# Patient Record
Sex: FEMALE | Race: WHITE | URBAN - METROPOLITAN AREA
[De-identification: names, ages, dates, MRNs, and addresses within clinical notes are randomized per-mention and may not be internally consistent; named-entity substitution may affect disease eponyms.]

---

## 2017-04-04 ENCOUNTER — TELEPHONE (OUTPATIENT)
Dept: OBGYN | Facility: CLINIC | Age: 57
End: 2017-04-04

## 2017-04-04 DIAGNOSIS — F32.A DEPRESSION, UNSPECIFIED DEPRESSION TYPE: ICD-10-CM

## 2017-04-04 RX ORDER — SERTRALINE HYDROCHLORIDE 25 MG/1
25 TABLET, FILM COATED ORAL DAILY
Qty: 90 TABLET | Refills: 0 | Status: SHIPPED | OUTPATIENT
Start: 2017-04-04 | End: 2017-07-06

## 2017-04-04 NOTE — TELEPHONE ENCOUNTER
----- Message from Itzel Kee sent at 4/4/2017 11:47 AM CDT -----  Regarding: Pt request  Contact: [phone]  Pt is  and has been away on Deployment.  She scheduled an Annual Return with Dr. Llamas, 1st available is 7/6/17.  Pt needs a refill for sertraline (ZOLOFT) 25 MG tablet.  She states that with her [employer] Insurance they are not able to request the refill through the usual process of having the Pharmacy request the refill. She was told that she has to call the Clinic directly to request refills.  Pharmacy is [facility].  The phone number for them is : [phone]   Pt  Prescription drug card patient number is  [id number].  Please call pt to assist.

## 2017-04-04 NOTE — TELEPHONE ENCOUNTER
Dr. Llamas approved refill in clinic. Nurse called into [facility] pharmacy with number listed from patient. [facility] pharmacy does not have e-prescribing in EPIC. Patient now has enough to get her through until her annual appointment.

## 2017-05-05 ENCOUNTER — TELEPHONE (OUTPATIENT)
Dept: OBGYN | Facility: CLINIC | Age: 57
End: 2017-05-05

## 2017-05-05 DIAGNOSIS — Z12.11 SCREENING FOR COLON CANCER: Primary | ICD-10-CM

## 2017-05-05 NOTE — TELEPHONE ENCOUNTER
"Pt has been away on deployment and is \"getting caught up\" with all of her health care needs. Placed referral for colonoscopy per GI request, and pt has annual scheduled with Dr. Llamas.   "

## 2017-05-05 NOTE — TELEPHONE ENCOUNTER
----- Message from Carline Black sent at 5/5/2017 10:22 AM CDT -----  Regarding: order for Endoscopy?  Contact: [phone]  Pt received a letter from Dr Ross office([facility]) stating she would need a order from her PCP which she said her PCP was Dr Llamas, I did inform her that Dr Llamas could not be a PCP as she is a OBGYN Dr but she is just wanting to know if she would need to come in to see Dr Llamas or if the order could just be entered?    Please call pt back at [phone] to let her know if Dr Llamas could do this or if Dr Llamas will not do this   Thank you!    Carline Black  Intake representative   Call Center

## 2017-05-08 ENCOUNTER — RADIANT APPOINTMENT (OUTPATIENT)
Dept: MAMMOGRAPHY | Facility: CLINIC | Age: 57
End: 2017-05-08

## 2017-05-08 DIAGNOSIS — Z12.31 VISIT FOR SCREENING MAMMOGRAM: ICD-10-CM

## 2017-07-01 ENCOUNTER — HEALTH MAINTENANCE LETTER (OUTPATIENT)
Age: 57
End: 2017-07-01

## 2017-07-06 ENCOUNTER — TELEPHONE (OUTPATIENT)
Dept: GASTROENTEROLOGY | Facility: CLINIC | Age: 57
End: 2017-07-06

## 2017-07-06 ENCOUNTER — OFFICE VISIT (OUTPATIENT)
Dept: OBGYN | Facility: CLINIC | Age: 57
End: 2017-07-06
Attending: OBSTETRICS & GYNECOLOGY
Payer: OTHER GOVERNMENT

## 2017-07-06 VITALS — BODY MASS INDEX: 26.13 KG/M2 | WEIGHT: 172.4 LBS | HEIGHT: 68 IN

## 2017-07-06 DIAGNOSIS — Z00.00 VISIT FOR PREVENTIVE HEALTH EXAMINATION: ICD-10-CM

## 2017-07-06 DIAGNOSIS — R19.7 DIARRHEA OF PRESUMED INFECTIOUS ORIGIN: Primary | ICD-10-CM

## 2017-07-06 DIAGNOSIS — N90.5 ATROPHY, VULVA: ICD-10-CM

## 2017-07-06 DIAGNOSIS — F32.A DEPRESSION, UNSPECIFIED DEPRESSION TYPE: ICD-10-CM

## 2017-07-06 PROCEDURE — 87177 OVA AND PARASITES SMEARS: CPT | Performed by: OBSTETRICS & GYNECOLOGY

## 2017-07-06 PROCEDURE — 99212 OFFICE O/P EST SF 10 MIN: CPT

## 2017-07-06 PROCEDURE — 87209 SMEAR COMPLEX STAIN: CPT | Performed by: OBSTETRICS & GYNECOLOGY

## 2017-07-06 RX ORDER — ESTRADIOL 0.1 MG/G
2 CREAM VAGINAL WEEKLY
Qty: 42.5 G | Refills: 3 | Status: SHIPPED | OUTPATIENT
Start: 2017-07-06 | End: 2017-07-06

## 2017-07-06 RX ORDER — CELECOXIB 200 MG/1
200 CAPSULE ORAL 2 TIMES DAILY
Qty: 90 CAPSULE | Refills: 3 | Status: SHIPPED | OUTPATIENT
Start: 2017-07-06 | End: 2017-07-17

## 2017-07-06 RX ORDER — ESTRADIOL 0.1 MG/G
2 CREAM VAGINAL WEEKLY
Qty: 42.5 G | Refills: 3 | Status: SHIPPED | OUTPATIENT
Start: 2017-07-06 | End: 2017-07-17

## 2017-07-06 RX ORDER — CELECOXIB 200 MG/1
CAPSULE ORAL
COMMUNITY
Start: 2016-05-05 | End: 2017-07-06

## 2017-07-06 RX ORDER — SERTRALINE HYDROCHLORIDE 25 MG/1
25 TABLET, FILM COATED ORAL DAILY
Qty: 90 TABLET | Refills: 3 | Status: SHIPPED | OUTPATIENT
Start: 2017-07-06 | End: 2017-07-17

## 2017-07-06 NOTE — LETTER
2017       RE: Arina Garcia  [address] [address]     Dear Colleague,    Thank you for referring your patient, Arina Garcia, to the [facility] at [facility]. Please see a copy of my visit note below.    Progress Note    SUBJECTIVE:  Arina Garcia is an 56 year old, , who requests an Annual Preventive Exam.       Concerns today include: vaginal pain    Menstrual History:  No flowsheet data found.    Last    Lab Results   Component Value Date    PAP NIL 2014     History of abnormal Pap smear: NO - age 30-65 PAP every 5 years with negative HPV co-testing recommended        Mammogram current: yes  Last Mammogram:   Ma Screening Digital Bilateral    Result Date: 3/28/2016  Narrative: SCREENING MAMMOGRAM, BILATERAL, DIGITAL, with CAD 3/28/2016 HISTORY: No current breast complaints noted COMPARISON:3/10/2015, 2014, 2014, 2012. BREAST DENSITY: Heterogeneously dense. No significant change.     Ma Screening Digital Bilateral    Result Date: 3/11/2015  Narrative: SCREENING MAMMOGRAM, BILATERAL, DIGITAL w/CAD - 3/10/2015 9:47 AM. BREAST SYMPTOMS: No current breast complaints. COMPARISON:  Diagnostic mammogram 2014, screening mammogram 2014, 2013, 2012, 2011. BREAST DENSITY: Heterogeneously dense. COMMENTS: No findings of suspicion for malignancy.   No significant change.     Ma Screening Digital Bilateral    Result Date: 2014  Narrative: Examination: Bilateral digital screening mammography with computer aided detection. Comparison: 2013, 2012 History: No symptoms, routine screening. 53-year-old female no current breast problems noted. BREAST DENSITY: Heterogeneously dense. COMMENTS: Possible developing calcifications in the anterior third of the right breast at approximately the 12:00 position.        Last Colonoscopy:  due      HISTORY:    Current  Outpatient Prescriptions on File Prior to Visit:  Omeprazole (PRILOSEC PO) Take  by mouth.   Multiple Vitamin (MULTI-VITAMIN PO) Take  by mouth.   [DISCONTINUED] sertraline (ZOLOFT) 25 MG tablet Take 1 tablet (25 mg) by mouth daily   Fluticasone-Salmeterol (ADVAIR HFA IN) Inhale  into the lungs.   GLUCOSAMINE PO Take  by mouth.     No current facility-administered medications on file prior to visit.   Allergies   Allergen Reactions     Cats      Dogs      Dust Mites        There is no immunization history on file for this patient.    Obstetric History       T0      L0     SAB0   TAB0   Ectopic0   Multiple0   Live Births0      Past Medical History:   Diagnosis Date     Plantar fasciitis      Past Surgical History:   Procedure Laterality Date     C LAPAROSCOPIC SUPRACERVICAL HYSTERECTOMY (SUBTOTAL HYSTERECTOMY), WITH OR  2009    simple hyperplasia, KIMBERLI     Family History   Problem Relation Age of Onset     C.A.D. Mother      renal failure on hospice 14     Cardiovascular Mother      CHF     Social History     Social History     Marital status: Single     Spouse name: N/A     Number of children: N/A     Years of education: N/A     Occupational History     [employer]     Social History Main Topics     Smoking status: Never Smoker     Smokeless tobacco: Never Used     Alcohol use No     Drug use: No     Sexual activity: Yes     Partners: Female     Other Topics Concern      Service Yes     [employer]a in [employer]     Blood Transfusions No     Caffeine Concern No     Occupational Exposure Yes     Hobby Hazards No     Sleep Concern No     Stress Concern No     Weight Concern No     Special Diet No     Back Care Yes     Exercise Yes     Bike Helmet Yes     Seat Belt Yes     Self-Exams Yes     Social History Narrative    2015    Sold her house in [place] and moved to [place] with new partner. Still working full time and in [employer].     Very happy.     Rides bike a lot.   "   Jo Llamas MD             How much exercise per week? 2 days    How much calcium per day? Vitamins and diet       How much caffeine per day? 3-4 cups    How much vitamin D per day? Vitamins and diet    Do you/your family wear seatbelts?  Yes    Do you/your family use safety helmets? No    Do you/your family use sunscreen? Yes    Do you/your family keep firearms in the home? Yes    Do you/your family have a smoke detector(s)? Yes        Do you feel safe in your home? Yes    Has anyone ever touched you in an unwanted manner? No        .memed            Long term monogamous lesbian relationship        4/25/14    She and partner bought camper trailer    2 small dogs    Lives in [facility].    Jo Llamas MD               ROS  [unfilled]  No flowsheet data found.  No flowsheet data found.      EXAM:  Height 1.715 m (5' 7.5\"), weight 78.2 kg (172 lb 6.4 oz), not currently breastfeeding. Body mass index is 26.6 kg/(m^2).  General - pleasant female in no acute distress.  Skin - no suspicious lesions or rashes  EENT-  PERRLA, euthyroid with out palpable nodules  Neck - supple without lymphadenopathy.  Lungs - clear to auscultation bilaterally.  Heart - regular rate and rhythm without murmur.  Abdomen - soft, nontender, nondistended, no masses or organomegaly noted.  Musculoskeletal - no gross deformities.  Neurological - normal strength, sensation, and mental status.    Breast Exam:  Breast: Without visible skin changes. No dimpling or lesions seen.   Breasts supple, non-tender with palpation, no dominant mass, nodularity, or nipple discharge noted bilaterally. Axillary nodes negative.      Pelvic Exam:  EG/BUS: Normal genital architecture without lesions, erythema or abnormal secretions Bartholin's, Urethra, [place]'s normal   Urethral meatus: normal   Urethra: no masses, tenderness, or scarring   Bladder: no masses or tenderness   Vagina: atrophic, thin, dry with odorless  secretions  Cervix: Nulliparous, and " no lesions  Uterus: surgically absent  Adnexa: Within normal limits and No masses, nodularity, tenderness  Rectum:anus normal       ASSESSMENT:  Encounter Diagnoses   Name Primary?     Diarrhea of presumed infectious origin Yes     Atrophy, vulva      Depression, unspecified depression type         PLAN:   Orders Placed This Encounter   Procedures     GASTROENTEROLOGY ADULT REF PROCEDURE ONLY     Orders Placed This Encounter   Medications     DISCONTD: celecoxib (CELEBREX) 200 MG capsule     DISCONTD: estradiol (ESTRACE VAGINAL) 0.1 MG/GM cream     Sig: Place 2 g vaginally once a week     Dispense:  42.5 g     Refill:  3     estradiol (ESTRACE VAGINAL) 0.1 MG/GM cream     Sig: Place 2 g vaginally once a week     Dispense:  42.5 g     Refill:  3     sertraline (ZOLOFT) 25 MG tablet     Sig: Take 1 tablet (25 mg) by mouth daily     Dispense:  90 tablet     Refill:  3     celecoxib (CELEBREX) 200 MG capsule     Sig: Take 1 capsule (200 mg) by mouth 2 times daily     Dispense:  90 capsule     Refill:  3       Additional teaching done at this visit regarding vulvar atrophy.    Return to clinic in one year.  Follow-up as needed.    Again, thank you for allowing me to participate in the care of your patient.      Sincerely,    Jo Llamas MD

## 2017-07-06 NOTE — PATIENT INSTRUCTIONS

## 2017-07-06 NOTE — PROGRESS NOTES
Progress Note    SUBJECTIVE:  Arina Garcia is an 56 year old, , who requests an Annual Preventive Exam.       Concerns today include: vaginal pain    Menstrual History:  No flowsheet data found.    Last    Lab Results   Component Value Date    PAP NIL 2014     History of abnormal Pap smear: NO - age 30-65 PAP every 5 years with negative HPV co-testing recommended        Mammogram current: yes  Last Mammogram:   Ma Screening Digital Bilateral    Result Date: 3/28/2016  Narrative: SCREENING MAMMOGRAM, BILATERAL, DIGITAL, with CAD 3/28/2016 HISTORY: No current breast complaints noted COMPARISON:3/10/2015, 2014, 2014, 2012. BREAST DENSITY: Heterogeneously dense. No significant change.     Ma Screening Digital Bilateral    Result Date: 3/11/2015  Narrative: SCREENING MAMMOGRAM, BILATERAL, DIGITAL w/CAD - 3/10/2015 9:47 AM. BREAST SYMPTOMS: No current breast complaints. COMPARISON:  Diagnostic mammogram 2014, screening mammogram 2014, 2013, 2012, 2011. BREAST DENSITY: Heterogeneously dense. COMMENTS: No findings of suspicion for malignancy.   No significant change.     Ma Screening Digital Bilateral    Result Date: 2014  Narrative: Examination: Bilateral digital screening mammography with computer aided detection. Comparison: 2013, 2012 History: No symptoms, routine screening. 53-year-old female no current breast problems noted. BREAST DENSITY: Heterogeneously dense. COMMENTS: Possible developing calcifications in the anterior third of the right breast at approximately the 12:00 position.        Last Colonoscopy:  due      HISTORY:    Current Outpatient Prescriptions on File Prior to Visit:  Omeprazole (PRILOSEC PO) Take  by mouth.   Multiple Vitamin (MULTI-VITAMIN PO) Take  by mouth.   [DISCONTINUED] sertraline (ZOLOFT) 25 MG tablet Take 1 tablet (25 mg) by mouth daily   Fluticasone-Salmeterol (ADVAIR HFA IN) Inhale  into the lungs.    GLUCOSAMINE PO Take  by mouth.     No current facility-administered medications on file prior to visit.   Allergies   Allergen Reactions     Cats      Dogs      Dust Mites        There is no immunization history on file for this patient.    Obstetric History       T0      L0     SAB0   TAB0   Ectopic0   Multiple0   Live Births0      Past Medical History:   Diagnosis Date     Plantar fasciitis      Past Surgical History:   Procedure Laterality Date     C LAPAROSCOPIC SUPRACERVICAL HYSTERECTOMY (SUBTOTAL HYSTERECTOMY), WITH OR  2009    simple KIMBERLI duran     Family History   Problem Relation Age of Onset     C.A.D. Mother      renal failure on hospice 14     Cardiovascular Mother      CHF     Social History     Social History     Marital status: Single     Spouse name: N/A     Number of children: N/A     Years of education: N/A     Occupational History     [employer]     Social History Main Topics     Smoking status: Never Smoker     Smokeless tobacco: Never Used     Alcohol use No     Drug use: No     Sexual activity: Yes     Partners: Female     Other Topics Concern      Service Yes     lena in [employer]     Blood Transfusions No     Caffeine Concern No     Occupational Exposure Yes     Hobby Hazards No     Sleep Concern No     Stress Concern No     Weight Concern No     Special Diet No     Back Care Yes     Exercise Yes     Bike Helmet Yes     Seat Belt Yes     Self-Exams Yes     Social History Narrative    2015    Sold her house in [place] and moved to [place] with new partner. Still working full time and in [employer].     Very happy.     Rides bike a lot.     Jo Llamas MD             How much exercise per week? 2 days    How much calcium per day? Vitamins and diet       How much caffeine per day? 3-4 cups    How much vitamin D per day? Vitamins and diet    Do you/your family wear seatbelts?  Yes    Do you/your family use safety helmets?  "No    Do you/your family use sunscreen? Yes    Do you/your family keep firearms in the home? Yes    Do you/your family have a smoke detector(s)? Yes        Do you feel safe in your home? Yes    Has anyone ever touched you in an unwanted manner? No        .memed            Long term monogamous lesbian relationship        4/25/14    She and partner bought camper trailer    2 small dogs    [place] [facility]    Jo Llamas MD               ROS  [unfilled]  No flowsheet data found.  No flowsheet data found.      EXAM:  Height 1.715 m (5' 7.5\"), weight 78.2 kg (172 lb 6.4 oz), not currently breastfeeding. Body mass index is 26.6 kg/(m^2).  General - pleasant female in no acute distress.  Skin - no suspicious lesions or rashes  EENT-  PERRLA, euthyroid with out palpable nodules  Neck - supple without lymphadenopathy.  Lungs - clear to auscultation bilaterally.  Heart - regular rate and rhythm without murmur.  Abdomen - soft, nontender, nondistended, no masses or organomegaly noted.  Musculoskeletal - no gross deformities.  Neurological - normal strength, sensation, and mental status.    Breast Exam:  Breast: Without visible skin changes. No dimpling or lesions seen.   Breasts supple, non-tender with palpation, no dominant mass, nodularity, or nipple discharge noted bilaterally. Axillary nodes negative.      Pelvic Exam:  EG/BUS: Normal genital architecture without lesions, erythema or abnormal secretions Bartholin's, Urethra, [place]'s normal   Urethral meatus: normal   Urethra: no masses, tenderness, or scarring   Bladder: no masses or tenderness   Vagina: atrophic, thin, dry with odorless  secretions  Cervix: Nulliparous, and no lesions  Uterus: surgically absent  Adnexa: Within normal limits and No masses, nodularity, tenderness  Rectum:anus normal       ASSESSMENT:  Encounter Diagnoses   Name Primary?     Diarrhea of presumed infectious origin Yes     Atrophy, vulva      Depression, unspecified depression type     "     PLAN:   Orders Placed This Encounter   Procedures     GASTROENTEROLOGY ADULT REF PROCEDURE ONLY     Orders Placed This Encounter   Medications     DISCONTD: celecoxib (CELEBREX) 200 MG capsule     DISCONTD: estradiol (ESTRACE VAGINAL) 0.1 MG/GM cream     Sig: Place 2 g vaginally once a week     Dispense:  42.5 g     Refill:  3     estradiol (ESTRACE VAGINAL) 0.1 MG/GM cream     Sig: Place 2 g vaginally once a week     Dispense:  42.5 g     Refill:  3     sertraline (ZOLOFT) 25 MG tablet     Sig: Take 1 tablet (25 mg) by mouth daily     Dispense:  90 tablet     Refill:  3     celecoxib (CELEBREX) 200 MG capsule     Sig: Take 1 capsule (200 mg) by mouth 2 times daily     Dispense:  90 capsule     Refill:  3       Additional teaching done at this visit regarding vulvar atrophy.    Return to clinic in one year.  Follow-up as needed.

## 2017-07-06 NOTE — MR AVS SNAPSHOT
After Visit Summary   7/6/2017    Arina Garcia    MRN: [id number]           Patient Information     Date Of Birth          1960        Visit Information        Provider Department      7/6/2017 12:45 PM Jo Llamas MD [facility]        Today's Diagnoses     Diarrhea of presumed infectious origin    -  1    Atrophy, vulva           Follow-ups after your visit        Additional Services     GASTROENTEROLOGY ADULT REF PROCEDURE ONLY       Last Lab Result: No results found for: CR  Body mass index is 26.6 kg/(m^2).     Needed:  No  Language:  English    Patient will be contacted to schedule procedure.     Please be aware that coverage of these services is subject to the terms and limitations of your health insurance plan.  Call member services at your health plan with any benefit or coverage questions.  Any procedures must be performed at a [facility] facility OR coordinated by your clinic's referral office.    Please bring the following with you to your appointment:    (1) Any X-Rays, CTs or MRIs which have been performed.  Contact the facility where they were done to arrange for  prior to your scheduled appointment.    (2) List of current medications   (3) This referral request   (4) Any documents/labs given to you for this referral                  Future tests that were ordered for you today     Open Future Orders        Priority Expected Expires Ordered    Ova and Parasite Exam Routine Routine  7/6/2018 7/6/2017            Who to contact     Please call your clinic at [phone] to:    Ask questions about your health    Make or cancel appointments    Discuss your medicines    Learn about your test results    Speak to your doctor   If you have compliments or concerns about an experience at your clinic, or if you wish to file a complaint, please contact [facility] Patient Relations at [phone] or email us at  "[email or web address]         Additional Information About Your Visit        [employer] Information     [employer] is an electronic gateway that provides easy, online access to your medical records. With [employer], you can request a clinic appointment, read your test results, renew a prescription or communicate with your care team.     To sign up for [employer] visit the website at [email or web address]   You will be asked to enter the access code listed below, as well as some personal information. Please follow the directions to create your username and password.     Your access code is: [email or web address]  Expires: 2017  6:30 AM     Your access code will  in 90 days. If you need help or a new code, please contact your [facility] or call [phone] for assistance.        Care EveryWhere ID     This is your Care EveryWhere ID. This could be used by other organizations to access your [facility] medical records  [id number]        Your Vitals Were     Height BMI (Body Mass Index)                1.715 m (5' 7.5\") 26.6 kg/m2           Blood Pressure from Last 3 Encounters:   08/07/15 120/84   14 130/66   14 114/72    Weight from Last 3 Encounters:   17 78.2 kg (172 lb 6.4 oz)   08/07/15 78 kg (172 lb)   14 73.5 kg (162 lb)              We Performed the Following     GASTROENTEROLOGY ADULT REF PROCEDURE ONLY          Today's Medication Changes          These changes are accurate as of: 17  1:06 PM.  If you have any questions, ask your nurse or doctor.               Start taking these medicines.        Dose/Directions    estradiol 0.1 MG/GM cream   Commonly known as:  ESTRACE VAGINAL   Used for:  Atrophy, vulva   Started by:  Jo Llamas MD        Dose:  2 g   Place 2 g vaginally once a week   Quantity:  42.5 g   Refills:  3            Where to get your medicines      Some of these will need a paper prescription and others can be bought over " the counter.  Ask your nurse if you have questions.     Bring a paper prescription for each of these medications     estradiol 0.1 MG/GM cream                Primary Care Provider Office Phone # Fax #    Tasia Mawuena Agbeh, [phone]       [facility] [address]  [place] [address]        Equal Access to Services     [facility]: Hadii aad ku hadasho Soomaali, waaxda luqadaha, qaybta kaalmada adeegyada, waxay idiin hayaan adeeg khjasvirsh laJaimeaan . So [facility] [phone].    ATENCIÓN: Si habla español, tiene a guardado disposición servicios gratuitos de asistencia lingüística. Llame al [phone].    We comply with applicable federal civil rights laws and [place] laws. We do not discriminate on the basis of race, color, national origin, age, disability sex, sexual orientation or gender identity.            Thank you!     Thank you for choosing [facility]  for your care. Our goal is always to provide you with excellent care. Hearing back from our patients is one way we can continue to improve our services. Please take a few minutes to complete the written survey that you may receive in the mail after your visit with us. Thank you!             Your Updated Medication List - Protect others around you: Learn how to safely use, store and throw away your medicines at www.disposemymeds.org.          This list is accurate as of: 7/6/17  1:06 PM.  Always use your most recent med list.                   Brand Name Dispense Instructions for use Diagnosis    ADVAIR HFA IN      Inhale  into the lungs.        celeBREX 200 MG capsule   Generic drug:  celecoxib           estradiol 0.1 MG/GM cream    ESTRACE VAGINAL    42.5 g    Place 2 g vaginally once a week    Atrophy, vulva       GLUCOSAMINE PO      Take  by mouth.        MULTI-VITAMIN PO      Take  by mouth.        PRILOSEC PO      Take  by mouth.        sertraline 25 MG tablet    ZOLOFT    90 tablet    Take 1 tablet  (25 mg) by mouth daily    Depression, unspecified depression type

## 2017-07-07 ENCOUNTER — TELEPHONE (OUTPATIENT)
Dept: GASTROENTEROLOGY | Facility: CLINIC | Age: 57
End: 2017-07-07

## 2017-07-07 LAB
MICRO REPORT STATUS: NORMAL
O+P STL MICRO: NORMAL
SPECIMEN SOURCE: NORMAL

## 2017-07-17 ENCOUNTER — TELEPHONE (OUTPATIENT)
Dept: OBGYN | Facility: CLINIC | Age: 57
End: 2017-07-17

## 2017-07-17 DIAGNOSIS — N90.5 ATROPHY, VULVA: ICD-10-CM

## 2017-07-17 DIAGNOSIS — F32.A DEPRESSION, UNSPECIFIED DEPRESSION TYPE: ICD-10-CM

## 2017-07-17 RX ORDER — CELECOXIB 200 MG/1
200 CAPSULE ORAL 2 TIMES DAILY
Qty: 180 CAPSULE | Refills: 3 | Status: SHIPPED | OUTPATIENT
Start: 2017-07-17

## 2017-07-17 RX ORDER — ESTRADIOL 0.1 MG/G
2 CREAM VAGINAL WEEKLY
Qty: 42.5 G | Refills: 3 | Status: SHIPPED | OUTPATIENT
Start: 2017-07-17

## 2017-07-17 RX ORDER — SERTRALINE HYDROCHLORIDE 25 MG/1
25 TABLET, FILM COATED ORAL DAILY
Qty: 90 TABLET | Refills: 3 | Status: SHIPPED | OUTPATIENT
Start: 2017-07-17

## 2017-07-17 NOTE — TELEPHONE ENCOUNTER
Spoke with Arina to confirm she was NOT given hard copies of prescriptions she is requesting as they are listed as 'local print'. She confirmed this. [facility]

## 2017-07-17 NOTE — TELEPHONE ENCOUNTER
----- Message from Sylvain Chasenick sent at 7/17/2017  9:46 AM CDT -----  Regarding: PT is wondering RX can be resent to [facility]  Contact: [phone]  PT is calling to request RX for estradiol and celecoxib be [facility] to [facility].  PT can be reached at [phone].    Thank you,  Sylvain    Call Center

## 2017-07-19 ENCOUNTER — TELEPHONE (OUTPATIENT)
Dept: GASTROENTEROLOGY | Facility: CLINIC | Age: 57
End: 2017-07-19

## 2017-07-19 DIAGNOSIS — Z12.11 ENCOUNTER FOR SCREENING COLONOSCOPY: Primary | ICD-10-CM

## 2017-07-19 NOTE — TELEPHONE ENCOUNTER
Patient scheduled for Colonoscopy    Indication for procedure. Diarrhea of presumed infectious origin    Referring Provider. Jo Llamas MD    ? Not needed    Arrival time verified? Yes, 0800    Facility location verified? Yes, [address] [address] [facility]    Instructions given regarding prep and procedure    Prep Type Golytely    Are you taking any anticoagulants or blood thinners? No    Instructions given? N/A    Electronic implanted devices? No    Pre procedure teaching completed? Yes    Transportation from procedure? Significant Other    H&P / Pre op physical completed? N/A

## 2017-07-26 ENCOUNTER — HOSPITAL ENCOUNTER (OUTPATIENT)
Facility: AMBULATORY SURGERY CENTER | Age: 57
End: 2017-07-26
Attending: INTERNAL MEDICINE

## 2017-07-26 ENCOUNTER — SURGERY (OUTPATIENT)
Age: 57
End: 2017-07-26

## 2017-07-26 VITALS
OXYGEN SATURATION: 99 % | SYSTOLIC BLOOD PRESSURE: 124 MMHG | BODY MASS INDEX: 26.07 KG/M2 | TEMPERATURE: 97.8 F | HEIGHT: 68 IN | WEIGHT: 172 LBS | DIASTOLIC BLOOD PRESSURE: 65 MMHG | RESPIRATION RATE: 16 BRPM

## 2017-07-26 LAB — COLONOSCOPY: NORMAL

## 2017-07-26 RX ORDER — FENTANYL CITRATE 50 UG/ML
25-100 INJECTION, SOLUTION INTRAMUSCULAR; INTRAVENOUS
Status: DISCONTINUED | OUTPATIENT
Start: 2017-07-26 | End: 2017-07-27 | Stop reason: HOSPADM

## 2017-07-26 RX ORDER — SODIUM CHLORIDE, SODIUM LACTATE, POTASSIUM CHLORIDE, CALCIUM CHLORIDE 600; 310; 30; 20 MG/100ML; MG/100ML; MG/100ML; MG/100ML
INJECTION, SOLUTION INTRAVENOUS CONTINUOUS
Status: DISCONTINUED | OUTPATIENT
Start: 2017-07-26 | End: 2017-07-27 | Stop reason: HOSPADM

## 2017-07-26 RX ADMIN — FENTANYL CITRATE 50 MCG: 50 INJECTION, SOLUTION INTRAMUSCULAR; INTRAVENOUS at 09:29

## 2017-07-26 RX ADMIN — FENTANYL CITRATE 50 MCG: 50 INJECTION, SOLUTION INTRAMUSCULAR; INTRAVENOUS at 09:38

## 2017-07-26 RX ADMIN — FENTANYL CITRATE 50 MCG: 50 INJECTION, SOLUTION INTRAMUSCULAR; INTRAVENOUS at 09:55

## 2017-07-26 RX ADMIN — SODIUM CHLORIDE, SODIUM LACTATE, POTASSIUM CHLORIDE, CALCIUM CHLORIDE: 600; 310; 30; 20 INJECTION, SOLUTION INTRAVENOUS at 09:20

## 2017-07-26 NOTE — IP AVS SNAPSHOT
[facility]    [address] [place] [address]    Phone:  [phone]    Fax:  [phone]                                       After Visit Summary   7/26/2017    Arina Garcia    MRN: [id number]           After Visit Summary Signature Page     I have received my discharge instructions, and my questions have been answered. I have discussed any challenges I see with this plan with the nurse or doctor.    ..........................................................................................................................................  Patient/Patient Representative Signature      ..........................................................................................................................................  Patient Representative Print Name and Relationship to Patient    ..................................................               ................................................  Date                                            Time    ..........................................................................................................................................  Reviewed by Signature/Title    ...................................................              ..............................................  Date                                                            Time

## 2017-07-26 NOTE — LETTER
Patient:  Arina Ibanez  :   1960  MRN:     [id number]          Ms.Lynette Tricia Ibanez  [address] [facility] [address]        2017    Dear ,    We are writing to inform you of your test results.    The colonoscopy biopsies show signs of left sided colitis (inflammation of the left side of the colon). Based on appearance of the samples this is likely a chronic process, and inflammation. It may represent ulcerative colitis, which is the condition described above. Sometimes, infectious colitis vs medications side effect may appears like this, but less likely.  You should discuss this with Dr. Llamas or with a gastroenterology specialist.     A copy of the results is forwarded to Dr. Llamas as well.   Hope that helps.     It has been a pleasure participating in your care.  Please feel free to contact our clinic with any further questions.      Sincerely,    Bryce Meléndez MD  [facility]  [address] [place] [address]  Phone: [phone]  Fax: [phone]     Resulted Orders   Surgical pathology exam   Result Value Ref Range    Copath Report       Patient Name: ARINA IBANEZ  MR#: [id number]  Specimen #: [id number]  Collected: 2017  Received: 2017  Reported: 2017 13:26  Ordering Phy(s): BRYCE MELÉNDEZ    For improved result formatting, select 'View Enhanced Report Format'  under Linked Documents section.    SPECIMEN(S):  A: Colon polyp, sigmoid  B: Splenic flexure polyp  C: Terminal ileum biopsy  D: Cecal biopsy  E: Colon biopsy, right  F: Colon biopsy, left    FINAL DIAGNOSIS:  A. COLON POLYP, SIGMOID:  - Inflammatory polyp  - Negative for dysplasia    B. SPLENIC FLEXURE POLYP:  - Colonic mucosa with mild edema  - Negative for dysplasia    C. TERMINAL ILEUM BIOPSY:  - Terminal ileal mucosa with prominent lymphoid follicles    D. CECAL BIOPSY:  - Colonic mucosa with prominent lymphoid  "infiltrate  - Negative for intraepithelial lymphocytosis or deposition of  subepithelial thick collagenous band    E. COLON BIOPSY, RIGHT:  - Colonic mucosa with prominent lymphoid infiltrate  - Negative for intraepithelial  lymphocytosis or deposition of  subepithelial thick collagenous band    F. COLON BIOPSY, LEFT:  - Mild chronic active proctitis with cryptitis  - Negative for dysplasia    I have personally reviewed all specimens and or slides, including the  listed special stains, and used them with my medical judgement to  determine the final diagnosis.    Electronically signed out by:    Melecio Malone M.D., PhD, [facility]    CLINICAL HISTORY:  Diarrhea    GROSS:  A: The specimen is received in formalin with proper patient  identification, labeled \"sigmoid colon polyps x2, history of diarrhea\",  and consists of two tan-pink soft tissue fragments, each 0.3 cm in  greatest dimension.  The specimen is wrapped and entirely submitted in  cassette A1.    B: The specimen is received in formalin with proper patient  identification, labeled \"splenic flexure polyp, history of diarrhea\",  and consists of one tan soft tissue fragment, 0.4 cm in greatest  dimension.  The specimen is wrapped and entirely sub mitted in cassette  B1.    C: The specimen is received in formalin with proper patient  identification, labeled \"terminal ileum biopsies, history of diarrhea\",  and consists of four tan soft tissue fragments, ranging from 0.2 cm to  0.4 cm in greatest dimension.  The specimen is wrapped and entirely  submitted in cassette C1.    D: The specimen is received in formalin with proper patient  identification, labeled \"cecum biopsies, history of diarrhea\", and  consists of two tan soft tissue fragments, 0.25 cm and 0.5 cm in  greatest dimension.  The specimen is wrapped and entirely submitted in  cassette D1.    E: The specimen is received in formalin with proper patient  identification, labeled \"right sided colon " "biopsies, history of  diarrhea\", and consists of three tan soft tissue fragments, ranging from  0.3 cm to 0.5 cm in greatest dimension.  The specimen is wrapped and  entirely submitted in cassette E1.    F: The specimen is received in formalin with proper patient  identification, l abeled \"left sided colon biopsies, history of  diarrhea\", and consists of two tan soft tissue fragments, 0.3 cm and 0.4  cm in greatest dimension.  The specimen is wrapped and entirely  submitted in cassette F1. (Dictated by: Annalee Sherman 7/26/2017 02:36 PM)    MICROSCOPIC:  Microscopic examination was performed.    CPT Codes:  A: 73875-BV1  B: 32198-AF2  C: 48314-IR8  D: 05665-QG3  E: 66813-UG2  F: 61368-QE7    TESTING LAB LOCATION:  [facility], [address]   [address]  [phone]    COLLECTION SITE:  Client: [facility]  Location: [facility] ([facility])         "

## 2017-07-26 NOTE — DISCHARGE INSTRUCTIONS
Discharge Instructions after Colonoscopy  or Sigmoidoscopy    Today you had a __x__ Colonoscopy ____ Sigmoidoscopy    Activity and Diet  You were given medicine for pain. You may be dizzy or sleepy.  For 24 hours:    Do not drive or use heavy equipment.    Do not make important decisions.    Do not drink any alcohol.  You may return to your normal diet and medicines.    Discomfort    Air was placed in your colon during the exam in order to see it. Walking helps to pass the air.    You may take Tylenol (acetaminophen) for pain unless your doctor has told you not to.  Do not take aspirin or ibuprofen (Advil, Motrin, or other anti-inflammatory  drugs) for _____ days.    Follow-up  ____ We took small tissue samples or polyps to study. Your doctor will call you with the results  within two weeks.    When to call:    Call right away if you have:    Unusual pain in belly or chest pain not relieved with passing air.    More than 1 to 2 Tablespoons of bleeding from your rectum.    Fever above 100.6  F (37.5  C).    If you have severe pain, bleeding, or shortness of breath, go to an emergency room.    If you have questions, call:  Monday to Friday, 7 a.m. to 4:30 p.m.  Endoscopy: [phone] (We may have to call you back)    After hours  Hospital: [phone] (Ask for the GI fellow on call)

## 2017-07-26 NOTE — OR NURSING
Colonoscopy with polypectomy via ERBE hot snare (25 watt, forced coag, 2 effect) and biopsy forceps and biopsies completed throughout.

## 2017-07-26 NOTE — IP AVS SNAPSHOT
MRN:[id number]                      After Visit Summary   7/26/2017    Arina Garcia    MRN: [id number]           Thank you!     Thank you for choosing [facility] for your care. Our goal is always to provide you with excellent care. Hearing back from our patients is one way we can continue to improve our services. Please take a few minutes to complete the written survey that you may receive in the mail after you visit with us. Thank you!        Patient Information     Date Of Birth          1960        About your hospital stay     You were admitted on:  July 26, 2017 [facility]    You were discharged on:  July 26, 2017       Who to Call     For medical emergencies, please call 911.  For non-urgent questions about your medical care, please call your primary care provider or clinic, [phone]  For questions related to your surgery, please call your surgery clinic        Attending Provider     Provider Specialty    Tobias Meléndez MD Hepatology       Primary Care Provider Office Phone # Fax #    [facility], [phone]      Further instructions from your care team       Discharge Instructions after Colonoscopy  or Sigmoidoscopy    Today you had a __x__ Colonoscopy ____ Sigmoidoscopy    Activity and Diet  You were given medicine for pain. You may be dizzy or sleepy.  For 24 hours:    Do not drive or use heavy equipment.    Do not make important decisions.    Do not drink any alcohol.  You may return to your normal diet and medicines.    Discomfort    Air was placed in your colon during the exam in order to see it. Walking helps to pass the air.    You may take Tylenol (acetaminophen) for pain unless your doctor has told you not to.  Do not take aspirin or ibuprofen (Advil, Motrin, or other anti-inflammatory  drugs) for _____ days.    Follow-up  ____ We took small tissue samples or polyps to study. Your  "doctor will call you with the results  within two weeks.    When to call:    Call right away if you have:    Unusual pain in belly or chest pain not relieved with passing air.    More than 1 to 2 Tablespoons of bleeding from your rectum.    Fever above 100.6  F (37.5  C).    If you have severe pain, bleeding, or shortness of breath, go to an emergency room.    If you have questions, call:  Monday to Friday, 7 a.m. to 4:30 p.m.  Endoscopy: [phone] (We may have to call you back)    After hours  Hospital: [phone] (Ask for the GI fellow on call)    Pending Results     No orders found from 2017 to 2017.            Admission Information     Date & Time Provider Department Dept. Phone    2017 Tobias Meléndez MD [facility] [phone]      Your Vitals Were     Blood Pressure Temperature Respirations Height Weight Pulse Oximetry    145/84 97.8  F (36.6  C) (Oral) 23 1.715 m (5' 7.5\") 78 kg (172 lb) 100%    BMI (Body Mass Index)                   26.54 kg/m2           MyChart Information     [employer] is an electronic gateway that provides easy, online access to your medical records. With [employer], you can request a clinic appointment, read your test results, renew a prescription or communicate with your care team.     To sign up for [employer] visit the website at [email or web address]   You will be asked to enter the access code listed below, as well as some personal information. Please follow the directions to create your username and password.     Your access code is: [email or web address]  Expires: 2017  6:30 AM     Your access code will  in 90 days. If you need help or a new code, please contact your [facility] or call [phone] for assistance.        Care EveryWhere ID     This is your Care EveryWhere ID. This could be used by other organizations to access your [facility] medical records  [id number]        Equal Access to Services  "    ALAN OTERO : Hadii aad ku chetna Somayaali, waaxda luqadaha, qaybta kaalmada adeegyada, domingo nithya yusufkaran messina lenankur skelton . So [facility] [phone].    ATENCIÓN: Si habla español, tiene a guardado disposición servicios gratuitos de asistencia lingüística. Llame al [phone].    We comply with applicable federal civil rights laws and [place] laws. We do not discriminate on the basis of race, color, national origin, age, disability sex, sexual orientation or gender identity.               Review of your medicines      UNREVIEWED medicines. Ask your doctor about these medicines        Dose / Directions    ADVAIR HFA IN        Inhale  into the lungs.   Refills:  0       celecoxib 200 MG capsule   Commonly known as:  celeBREX   Used for:  Depression, unspecified depression type        Dose:  200 mg   Take 1 capsule (200 mg) by mouth 2 times daily   Quantity:  180 capsule   Refills:  3       estradiol 0.1 MG/GM cream   Commonly known as:  ESTRACE VAGINAL   Used for:  Atrophy, vulva        Dose:  2 g   Place 2 g vaginally once a week   Quantity:  42.5 g   Refills:  3       GLUCOSAMINE PO        Take  by mouth.   Refills:  0       MULTI-VITAMIN PO        Take  by mouth.   Refills:  0       PRILOSEC PO        Take  by mouth.   Refills:  0       sertraline 25 MG tablet   Commonly known as:  ZOLOFT   Used for:  Depression, unspecified depression type        Dose:  25 mg   Take 1 tablet (25 mg) by mouth daily   Quantity:  90 tablet   Refills:  3                Protect others around you: Learn how to safely use, store and throw away your medicines at www.disposemymeds.org.             Medication List: This is a list of all your medications and when to take them. Check marks below indicate your daily home schedule. Keep this list as a reference.      Medications           Morning Afternoon Evening Bedtime As Needed    ADVAIR HFA IN   Inhale  into the lungs.                                celecoxib 200 MG capsule    Commonly known as:  celeBREX   Take 1 capsule (200 mg) by mouth 2 times daily                                estradiol 0.1 MG/GM cream   Commonly known as:  ESTRACE VAGINAL   Place 2 g vaginally once a week                                GLUCOSAMINE PO   Take  by mouth.                                MULTI-VITAMIN PO   Take  by mouth.                                PRILOSEC PO   Take  by mouth.                                sertraline 25 MG tablet   Commonly known as:  ZOLOFT   Take 1 tablet (25 mg) by mouth daily

## 2017-07-27 LAB — COPATH REPORT: NORMAL

## 2017-08-09 ENCOUNTER — TELEPHONE (OUTPATIENT)
Dept: OBGYN | Facility: CLINIC | Age: 57
End: 2017-08-09

## 2017-08-09 DIAGNOSIS — K51.50: Primary | ICD-10-CM

## 2017-08-09 DIAGNOSIS — K52.9 COLITIS: ICD-10-CM

## 2017-08-09 NOTE — TELEPHONE ENCOUNTER
Pt calling as she needs to schedule with a gastroenterologist for follow up on her colonoscopy showing left sided colitis. She needs this to come from Dr. Llamas. 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 Will route request to Dr. Llamas.

## 2017-10-05 ENCOUNTER — CARE COORDINATION (OUTPATIENT)
Dept: GASTROENTEROLOGY | Facility: CLINIC | Age: 57
End: 2017-10-05

## 2017-10-05 NOTE — PROGRESS NOTES
Called pt to offer her an appointment on October 23 with Dr. Gallo. [facility] not [facility]. Pt has my name and number.

## 2018-05-30 ENCOUNTER — RADIANT APPOINTMENT (OUTPATIENT)
Dept: MAMMOGRAPHY | Facility: CLINIC | Age: 58
End: 2018-05-30
Attending: OBSTETRICS & GYNECOLOGY
Payer: COMMERCIAL

## 2018-05-30 DIAGNOSIS — Z12.31 VISIT FOR SCREENING MAMMOGRAM: ICD-10-CM

## 2019-10-02 ENCOUNTER — HEALTH MAINTENANCE LETTER (OUTPATIENT)
Age: 59
End: 2019-10-02

## 2021-01-15 ENCOUNTER — HEALTH MAINTENANCE LETTER (OUTPATIENT)
Age: 61
End: 2021-01-15

## 2021-09-05 ENCOUNTER — HEALTH MAINTENANCE LETTER (OUTPATIENT)
Age: 61
End: 2021-09-05

## 2022-02-19 ENCOUNTER — HEALTH MAINTENANCE LETTER (OUTPATIENT)
Age: 62
End: 2022-02-19

## 2022-10-22 ENCOUNTER — HEALTH MAINTENANCE LETTER (OUTPATIENT)
Age: 62
End: 2022-10-22

## 2023-04-01 ENCOUNTER — HEALTH MAINTENANCE LETTER (OUTPATIENT)
Age: 63
End: 2023-04-01

## (undated) RX ORDER — FENTANYL CITRATE 50 UG/ML
INJECTION, SOLUTION INTRAMUSCULAR; INTRAVENOUS
Status: DISPENSED
Start: 2017-07-26

## (undated) RX ORDER — DIPHENHYDRAMINE HYDROCHLORIDE 50 MG/ML
INJECTION INTRAMUSCULAR; INTRAVENOUS
Status: DISPENSED
Start: 2017-07-26